# Patient Record
Sex: FEMALE
[De-identification: names, ages, dates, MRNs, and addresses within clinical notes are randomized per-mention and may not be internally consistent; named-entity substitution may affect disease eponyms.]

---

## 2020-08-09 ENCOUNTER — NURSE TRIAGE (OUTPATIENT)
Dept: OTHER | Facility: CLINIC | Age: 60
End: 2020-08-09

## 2020-08-10 NOTE — TELEPHONE ENCOUNTER
MMO    [place]  She recounts she squatted on purple flowered weed to urinate  Then rash- 4 bumps on rear end appeared  Mild itch  One is near anus  No signs of infection    Taking sitz baths  Hydrocortisone cream  Keeping clean and dry  Light clothing  Not really working  No s/s of infection or open/oozing area  Not worsening or getting better. Care advice discussed  Will continue home care, if no improvement in a few days call PCP  Understanding verbalzized      Reason for Disposition   Mild localized itching    Answer Assessment - Initial Assessment Questions  1. DESCRIPTION: \"Describe the itching you are having. \" \"Where is it located? \"  bottom  2. SEVERITY: \"How bad is it? \"     - MILD - doesn't interfere with normal activities      3. SCRATCHING: \"Are there any scratch marks? Bleeding? \"  Bumps that itch (mild)  4. ONSET: \"When did the itching begin? \"   Last SUnday  5. CAUSE: \"What do you think is causing the itching? \"   Contact with weed  6. OTHER SYMPTOMS: \"Do you have any other symptoms? \"   no  7. PREGNANCY: \"Is there any chance you are pregnant? \" \"When was your last menstrual period? \"  no    Protocols used: ITCHING - LOCALIZED-ADULT-